# Patient Record
Sex: MALE | Race: WHITE | Employment: FULL TIME | URBAN - METROPOLITAN AREA
[De-identification: names, ages, dates, MRNs, and addresses within clinical notes are randomized per-mention and may not be internally consistent; named-entity substitution may affect disease eponyms.]

---

## 2019-05-21 ENCOUNTER — ANCILLARY PROCEDURE (OUTPATIENT)
Dept: GENERAL RADIOLOGY | Facility: CLINIC | Age: 34
End: 2019-05-21
Attending: PHYSICIAN ASSISTANT
Payer: COMMERCIAL

## 2019-05-21 ENCOUNTER — OFFICE VISIT (OUTPATIENT)
Dept: URGENT CARE | Facility: URGENT CARE | Age: 34
End: 2019-05-21
Payer: COMMERCIAL

## 2019-05-21 VITALS
WEIGHT: 175 LBS | OXYGEN SATURATION: 99 % | DIASTOLIC BLOOD PRESSURE: 84 MMHG | SYSTOLIC BLOOD PRESSURE: 136 MMHG | TEMPERATURE: 98.4 F | HEART RATE: 90 BPM

## 2019-05-21 DIAGNOSIS — R07.81 RIB PAIN: ICD-10-CM

## 2019-05-21 DIAGNOSIS — S20.212A RIB CONTUSION, LEFT, INITIAL ENCOUNTER: Primary | ICD-10-CM

## 2019-05-21 PROCEDURE — 99203 OFFICE O/P NEW LOW 30 MIN: CPT | Performed by: PHYSICIAN ASSISTANT

## 2019-05-21 PROCEDURE — 71101 X-RAY EXAM UNILAT RIBS/CHEST: CPT | Mod: LT

## 2019-05-21 RX ORDER — IBUPROFEN 200 MG
200 TABLET ORAL EVERY 4 HOURS PRN
COMMUNITY

## 2019-05-21 NOTE — PROGRESS NOTES
"SUBJECTIVE:   Dilip Holliday is a 34 year old male presenting for evaluation of   Chief Complaint   Patient presents with     Urgent Care     Pt in clinic to have eval for left side rib pain.     Rib Pain   .  A few weeks ago he was doing some testing in [facility]. He was sparring with a partner and took a few hits to the left ribs. Initially \"he didn't feel anything.\" But later he noticed feeling sore. He has had ongoing left rib pain and tenderness, which got better but then got worse again after doing Taekwando again.  It hurts to run and hurts if he lays on his left side.  No shortness of breath. No cough. No fever.  He has been taking ibuprofen since last night for pain.      ROS  See HPI    PMH:  Past Medical History:   Diagnosis Date     NO ACTIVE PROBLEMS      There are no active problems to display for this patient.        Current medications:  Current Outpatient Medications   Medication Sig Dispense Refill     ibuprofen (ADVIL/MOTRIN) 200 MG tablet Take 200 mg by mouth every 4 hours as needed for mild pain         Surgical History:  No past surgical history on file.    Family history:  No family history on file.      Social History:  Social History     Tobacco Use     Smoking status: Never Smoker     Smokeless tobacco: Never Used   Substance Use Topics     Alcohol use: Not on file         OBJECTIVE  /84   Pulse 90   Temp 98.4  F (36.9  C) (Oral)   Wt 79.4 kg (175 lb)   SpO2 99%     Physical Exam    General Appearance:  Alert, cooperative, no distress, appears stated age. Afebrile. Appears comfortable sitting in chair. Rises from seated position without difficulty.  Integument: Warm, dry, no rashes or lesions.  Respiratory: No distress. Lungs clear to ausculation bilaterally. No crackles, wheezes, rhonchi or stridor.  Cardiovascular: Regular rate and rhythm, no murmur, rub or gallop. No obvious chest wall deformities  Musculoskeletal: chest: no sternal tenderness. Diffuse tenderness over left rib cage " under axilla ranging to mid-clavicular line.        Labs:  No results found for this or any previous visit (from the past 24 hour(s)).    X-Ray was done, my findings are: no displaced rib fractures  Lungs clear, no infiltrates or atelectasis        ASSESSMENT/PLAN:      ICD-10-CM    1. Rib contusion, left, initial encounter S20.212A    2. Rib pain R07.81 XR Ribs & Chest Left G/E 3 Views        Medical Decision Making:     Serious Comorbid Conditions: none    Differential Diagnosis: rib contusion vs rib fracture    Xrays revealed no displaced rib fracture and clear lungs  History and negative xrays most suggestive of rib contusion.  Recommend rest, ice, NSAIDs. Stressed the importance of deep breaths to prevent pneumonia or atlectasis- do not think this will be a problem for him as he is not splinting with breathing now.  Follow up with PCP if no improvement in pain in 2-3 weeks, sooner if new/worsening symptoms such as cough or fever.    At the end of the encounter, I discussed all available results with patient. Discussed diagnosis and treatment plan.   Return precautions provided to patient and printed below in patient instructions.  Patient understood and agreed to plan. Patient was appropriate for discharge.        Patient Instructions   There were no fractures seen on your rib xrays today.  The lungs underneath also look great!    Your symptoms are likely due to a rib contusion.  Take ibuprofen as needed for pain. Take with food.  Important to practice taking big deep breaths frequently to prevent pneumonia or collapsed lung.  It may take 4-6 weeks for complete healing.  Follow up with your doctor if no improvement in pain in 2-3 weeks.  Be seen immediately if fever, cough, or any other new, concerning symptoms.    Patient Education     Rib Contusion     A rib contusion is a bruise to one or more rib bones. It may cause pain, tenderness, swelling and a purplish discoloration. There may be a sharp pain while  breathing.  You will be assessed for other injuries. You will likely be given pain medicine. Rib contusions heal on their own, without further treatment. However, pain may take weeks to months to go away.   Note that a small crack (fracture) in the rib may cause the same symptoms as a rib contusion. The small crack may not be seen on a chest X-ray. However, the conditions are managed in the same way.  Home care    Rest. Avoid heavy lifting, strenuous exertion, or any activity that causes pain.    Ice the area to reduce pain and swelling. Put ice cubes in a plastic bag or use a cold pack. (Wrap the cold source in a thin towel. Do not place it directly on your skin.) Ice the injured area for 20 minutes every 1 to 2 hours the first day. Continue with ice packs 3 to 4 times a day for the next 2 days, then as needed for the relief of pain and swelling.    Take any prescribed pain medicine as directed by your healthcare provider. If none was prescribed, take acetaminophen, ibuprofen, or naproxen to control pain.    If you have a significant injury, you may be given a device called an incentive spirometer to keep your lungs healthy. Use as directed.  Follow-up care  Follow up with your healthcare provider during the next week or as directed.  When to seek medical advice  Call your healthcare provider for any of the following:    Shortness of breath or trouble breathing    Increasing chest pain with breathing    Coughing    Dizziness, weakness, or fainting    New or worsening pain    Fever of 100.4 F (38 C) or higher, or as directed by your healthcare provider  Date Last Reviewed: 2/1/2017 2000-2018 [employer]. [address] [place], [place] [id number]. All rights reserved. This information is not intended as a substitute for professional medical care. Always follow your healthcare professional's instructions.                     Priyanka Pyle PA-C  05/21/19 5:04 PM

## 2019-05-21 NOTE — PATIENT INSTRUCTIONS
There were no fractures seen on your rib xrays today.  The lungs underneath also look great!    Your symptoms are likely due to a rib contusion.  Take ibuprofen as needed for pain. Take with food.  Important to practice taking big deep breaths frequently to prevent pneumonia or collapsed lung.  It may take 4-6 weeks for complete healing.  Follow up with your doctor if no improvement in pain in 2-3 weeks.  Be seen immediately if fever, cough, or any other new, concerning symptoms.    Patient Education     Rib Contusion     A rib contusion is a bruise to one or more rib bones. It may cause pain, tenderness, swelling and a purplish discoloration. There may be a sharp pain while breathing.  You will be assessed for other injuries. You will likely be given pain medicine. Rib contusions heal on their own, without further treatment. However, pain may take weeks to months to go away.   Note that a small crack (fracture) in the rib may cause the same symptoms as a rib contusion. The small crack may not be seen on a chest X-ray. However, the conditions are managed in the same way.  Home care    Rest. Avoid heavy lifting, strenuous exertion, or any activity that causes pain.    Ice the area to reduce pain and swelling. Put ice cubes in a plastic bag or use a cold pack. (Wrap the cold source in a thin towel. Do not place it directly on your skin.) Ice the injured area for 20 minutes every 1 to 2 hours the first day. Continue with ice packs 3 to 4 times a day for the next 2 days, then as needed for the relief of pain and swelling.    Take any prescribed pain medicine as directed by your healthcare provider. If none was prescribed, take acetaminophen, ibuprofen, or naproxen to control pain.    If you have a significant injury, you may be given a device called an incentive spirometer to keep your lungs healthy. Use as directed.  Follow-up care  Follow up with your healthcare provider during the next week or as directed.  When to  seek medical advice  Call your healthcare provider for any of the following:    Shortness of breath or trouble breathing    Increasing chest pain with breathing    Coughing    Dizziness, weakness, or fainting    New or worsening pain    Fever of 100.4 F (38 C) or higher, or as directed by your healthcare provider  Date Last Reviewed: 2/1/2017 2000-2018 [employer]. [address] [place] [address]. All rights reserved. This information is not intended as a substitute for professional medical care. Always follow your healthcare professional's instructions.

## 2019-05-22 ENCOUNTER — TELEPHONE (OUTPATIENT)
Dept: FAMILY MEDICINE | Facility: CLINIC | Age: 34
End: 2019-05-22

## 2019-05-22 NOTE — TELEPHONE ENCOUNTER
Sounds good.  Please ask him to return to primary care in one week for a follow up and repeat chest xray.  Appointment with a provider please.  Thank you.

## 2019-05-22 NOTE — TELEPHONE ENCOUNTER
Dr. Solomon review:  Please review Xray and give a follow up time frame for the repeat xray that was recommended.        Radiologist from [facility] calling with abnormal finding on xray .    Dr. Festus Gamez calling from reading room reporting what could be a very small right pneumothorax which will need a follow up Xray (no time frame given when asked)     The patient had injury and pain on left rib area.      Triage nurse to send this message to Dr. Solomon who is covering today for .      Patient reports no PCP or clinic and told him we are happy to follow up with him here.  He is going to refrain from his Taekwando for a few weeks.    Told him to call and report any breathing difficulties. He has no respiratory problem at this time.   Yvette Zamorano RN

## 2019-05-29 ENCOUNTER — OFFICE VISIT (OUTPATIENT)
Dept: FAMILY MEDICINE | Facility: CLINIC | Age: 34
End: 2019-05-29
Payer: COMMERCIAL

## 2019-05-29 ENCOUNTER — ANCILLARY PROCEDURE (OUTPATIENT)
Dept: GENERAL RADIOLOGY | Facility: CLINIC | Age: 34
End: 2019-05-29
Attending: NURSE PRACTITIONER
Payer: COMMERCIAL

## 2019-05-29 VITALS
OXYGEN SATURATION: 97 % | DIASTOLIC BLOOD PRESSURE: 88 MMHG | HEART RATE: 80 BPM | HEIGHT: 73 IN | BODY MASS INDEX: 23.59 KG/M2 | TEMPERATURE: 97.8 F | WEIGHT: 178 LBS | SYSTOLIC BLOOD PRESSURE: 127 MMHG

## 2019-05-29 DIAGNOSIS — J93.9 PNEUMOTHORAX, RIGHT: ICD-10-CM

## 2019-05-29 DIAGNOSIS — J93.9 PNEUMOTHORAX, RIGHT: Primary | ICD-10-CM

## 2019-05-29 PROCEDURE — 71046 X-RAY EXAM CHEST 2 VIEWS: CPT

## 2019-05-29 PROCEDURE — 99213 OFFICE O/P EST LOW 20 MIN: CPT | Performed by: NURSE PRACTITIONER

## 2019-05-29 ASSESSMENT — MIFFLIN-ST. JEOR: SCORE: 1801.28

## 2019-05-29 NOTE — PROGRESS NOTES
"Subjective     Dilip Holliday is a 34 year old male who presents to clinic today for the following health issues:    Chief Complaint   Patient presents with     RECHECK     pneumothorax left side 3x weeks ago last monday struggling out of bed         HPI     3 weeks ago Dilip was \"testing\" in [facility].  He sustained \"a few hits to the ribs.\"  Over the next week he seemed to feel better.  He went back to [facility] on 5/15/2019.  After the 5/19 class was completed, his chest was bothering him.   He was seen on 5/21/2019 in  with chest pain and he was diagnosed with a small pneumothorax.     Lately he has been tired, avoiding sleeping on his stomach.     Yesterday he walked 6 blocks while carrying groceries and he did great. No SOB, chest pain, etc.  He is here today for follow-up chest x-ray as he was instructed.    He denies any other acute issues.      There is no problem list on file for this patient.    History reviewed. No pertinent surgical history.    Social History     Tobacco Use     Smoking status: Never Smoker     Smokeless tobacco: Never Used   Substance Use Topics     Alcohol use: Not on file     History reviewed. No pertinent family history.      Current Outpatient Medications   Medication Sig Dispense Refill     ibuprofen (ADVIL/MOTRIN) 200 MG tablet Take 200 mg by mouth every 4 hours as needed for mild pain       No Known Allergies  No lab results found.   BP Readings from Last 3 Encounters:   05/29/19 127/88   05/21/19 136/84    Wt Readings from Last 3 Encounters:   05/29/19 80.7 kg (178 lb)   05/21/19 79.4 kg (175 lb)                    Reviewed and updated as needed this visit by Provider  Tobacco  Allergies  Meds  Problems  Med Hx  Surg Hx  Fam Hx         Review of Systems   ROS COMP: Constitutional, HEENT, cardiovascular, pulmonary, GI, , musculoskeletal, neuro, skin, endocrine and psych systems are negative, except as otherwise noted.      Objective    /88 (BP Location: Left arm, " "Patient Position: Sitting, Cuff Size: Adult Regular)   Pulse 80   Temp 97.8  F (36.6  C) (Oral)   Ht 1.854 m (6' 1\")   Wt 80.7 kg (178 lb)   SpO2 97%   BMI 23.48 kg/m    Body mass index is 23.48 kg/m .  Physical Exam     GENERAL: healthy, alert and no distress  EYES: Eyes grossly normal to inspection, PERRL and conjunctivae and sclerae normal  NECK: no adenopathy and thyroid normal to palpation  RESP: Respirations are easy and regular.  Chest expansion is symmetrical. Lungs clear to auscultation - no rales, rhonchi or wheezes  CV: regular rate and rhythm, normal S1 S2, no S3 or S4, no murmur, click or rub, no peripheral edema and peripheral pulses strong    MS: Ambulatory with a steady gait.   SKIN: warm and dry  NEURO: Normal strength and tone, mentation intact and speech normal  PSYCH: mentation appears normal, affect normal/bright      Chest x-ray: I interpreted today's chest x-ray is clear with no sign pneumothorax.  I did discuss with the patient and we will also await the radiologist's final interpretation of the x-ray.  I will let him know by my chart message those final results.        Assessment & Plan     (J93.9) Pneumothorax, right  (primary encounter diagnosis)  Comment: Resolved  Plan: For today, I discussed with the patient that it appears his pneumothorax is resolved.  No additional follow-up x-rays will be necessary.  If he develops any other questions or concerns, he can be rechecked anytime.           No follow-ups on file.    CHIDI Dseir [facility]        "

## 2019-05-30 NOTE — RESULT ENCOUNTER NOTE
Otoniel Cherry,    This note is to let you know that the radiologist interpreted your chest x-ray is clear with no remaining pneumothorax.  There are no additional x-rays necessary for pneumothorax follow-up.    Take good care of yourself. If there is anything else that I can do for you, please do not hesitate to let me know.    Lyndsey MURILLO CNP

## 2019-05-30 NOTE — PATIENT INSTRUCTIONS
Patient Education     Traumatic (Blunt Trauma) Pneumothorax  Pneumothorax is when air leaks out and gets trapped in the space between the lung and the chest wall (pleural space). It can cause complete or partial collapse of a lung. The trapped air prevents the lung from re-inflating. Pneumothorax can occur as a result of a blow to the chest, such as from a fall or car accident (blunt trauma). It can happen with or without a broken rib.  A small pneumothorax caused by blunt trauma can be treated at home. The trapped air will be absorbed, and the lung will re-expand by itself. Larger amounts of trapped air must be treated in a hospital.  Symptoms    Trouble breathing or being unable to take a full breath    Sharp chest pain when breathing    Tightness in the chest  Home care    Rest at home. Do not do vigorous activity or exercise for the next week.    You may use over-the-counter pain medicine to control pain, unless another medicine was prescribed. If you have chronic liver or kidney disease or have ever had a stomach ulcer or gastrointestinal bleeding, talk with your healthcare provider before using these medicines. Also talk with your provider if you are taking medicine to prevent blood clots.    During the next 3 days, it is important to take 4 slow, deep breaths every 1 to 2 hours while awake. Do this even though your chest may hurt when you breathe. It sends extra oxygen and blood to the lung. This is important to help keep the lung expanded. If an incentive spirometer (breathing exercise device) was given, use it as directed.    If you smoke or use e-cigarettes, quit.    Do not fly or go diving until your healthcare provider says it s safe to do so.  Follow-up care  Follow up with your healthcare provider, or as advised. If X-rays have been taken, you will be notified of any new findings that may affect your care. You can also call as advised for the results.  Call 911  Call 911 if any of these  occur.    Trouble breathing    Confusion or difficulty arousing    Rapid heart rate    New pain in the chest, arm, shoulder, neck or upper back    Weakness, dizziness, or fainting  When to seek medical advice  Call your healthcare provider right away if any of these occur.    Increased pain with breathing    Fever, productive cough  Date Last Reviewed: 10/1/2016    [id number] [employer]. [address] [place] [address]. All rights reserved. This information is not intended as a substitute for professional medical care. Always follow your healthcare professional's instructions.

## 2019-08-22 NOTE — TELEPHONE ENCOUNTER
RECORDS RECEIVED FROM: Rib pain on right side/Lyndsey Sawyer APRN CNP/XR/[facility]/ortho con   DATE RECEIVED: 8/22   NOTES STATUS DETAILS   OFFICE NOTE from referring provider Internal    OFFICE NOTE from other specialist Internal    DISCHARGE SUMMARY from hospital N/A    DISCHARGE REPORT from the ER N/A    OPERATIVE REPORT N/A    MEDICATION LIST Internal    MRI N/A    CT SCAN N/A    XRAYS (IMAGES & REPORTS) Internal    TUMOR     PATHOLOGY  Slides & report N/A

## 2019-09-03 ENCOUNTER — OFFICE VISIT (OUTPATIENT)
Dept: ORTHOPEDICS | Facility: CLINIC | Age: 34
End: 2019-09-03
Payer: COMMERCIAL

## 2019-09-03 ENCOUNTER — PRE VISIT (OUTPATIENT)
Dept: ORTHOPEDICS | Facility: CLINIC | Age: 34
End: 2019-09-03

## 2019-09-03 VITALS — WEIGHT: 180 LBS | BODY MASS INDEX: 23.86 KG/M2 | HEIGHT: 73 IN

## 2019-09-03 DIAGNOSIS — S29.011D INTERCOSTAL MUSCLE STRAIN, SUBSEQUENT ENCOUNTER: ICD-10-CM

## 2019-09-03 DIAGNOSIS — S20.212D RIB CONTUSION, LEFT, SUBSEQUENT ENCOUNTER: Primary | ICD-10-CM

## 2019-09-03 ASSESSMENT — MIFFLIN-ST. JEOR: SCORE: 1810.35

## 2019-09-03 NOTE — Clinical Note
Thanks for the referral.  Surprising that he would have a traumatic pneumothorax w/o rib injury. I bet he had an occult fx which is slowly resolving and irritating the muscles attached to it. He declined any further workup or intervention. I think he will continue to improve.  - Andriy

## 2019-09-03 NOTE — LETTER
"  9/3/2019      RE: Dilip Holliday  [address]  [place] [address]       Pt is a 34 year old male referred by Dr Sawyer here today for:     L Rib pain   Initially presented 5/21/19 w/ rib pain s/p sparring injury in [facility]  Neg rib fx but subsequently noted to have small R pneumothorax -> resolved but rib pain has persisted  Overall pain is significantly better  Heavy lifting and core activities still very sore  Restarted [employer] classes again in mid July but between injury and then, no real gym workouts or impact   No pain w/ regular activities     See below for additional details    IPGt message 8/19/19:   \"Otoniel Solorio,     Thanks for the response. I am still on the fence with the option I want to choose, but I am leaning towards the ortho option to look deeper into my muscles/tendons. My breathing isn't an issue for me. I can run across the street without getting phased (out of breath). It's just when I lay flat on a hard surface or lift something really heavy on my left side that I can feel the deep pain in my ribcage on that side. I went kayaking over the weekend, but the actual paddling wasn't hard to do, it was lifting the kayak off the beach with my left arm (where the pain is under) versus my right that was much harder to do\"    Past Medical History:   Diagnosis Date     NO ACTIVE PROBLEMS       No past surgical history on file.   Current Outpatient Medications   Medication Sig Dispense Refill     ibuprofen (ADVIL/MOTRIN) 200 MG tablet Take 200 mg by mouth every 4 hours as needed for mild pain        No Known Allergies   Social History     Tobacco Use     Smoking status: Never Smoker     Smokeless tobacco: Never Used   Substance Use Topics     Alcohol use: None     Drug use: None      No family history on file.     ROS:   Gen- no fevers/chills   Rheum - no morning stiffness   Derm - no rash/ redness   Neuro - no numbness, no tingling   Remainder of ROS negative.     Exam:   Ht 1.854 " "m (6' 1\")   Wt 81.6 kg (180 lb)   BMI 23.75 kg/m        +TTP along axillar ribcage on L   No pain w/ resisted shoulder/ arm motions      (S20.212D) Rib contusion, left, subsequent encounter  (primary encounter diagnosis)  Comment:   Plan: reviewed likely dx of rib contusion vs occult fx vs intercostal muscle injury; regardless, tx would be 4-6 wks of rest and subsequent rehab and reintroduction of activities; I discussed checking a bone scan or MRI though I am unsure how much this would change mgmt.  I also discussed option of PT w/ focus on obliques/ core; he can pad the area when sparring or try a lidoderm patch prn; he declined all imaging and interventions and was pleased w/ reassurance; f/u prn    (S29.011D) Intercostal muscle strain, subsequent encounter  Comment:   Plan: see above      Andriy Sam MD  September 3, 2019  4:34 PM      "

## 2020-03-11 ENCOUNTER — HEALTH MAINTENANCE LETTER (OUTPATIENT)
Age: 35
End: 2020-03-11

## 2020-11-15 ENCOUNTER — OFFICE VISIT (OUTPATIENT)
Dept: URGENT CARE | Facility: URGENT CARE | Age: 35
End: 2020-11-15
Payer: COMMERCIAL

## 2020-11-15 ENCOUNTER — NURSE TRIAGE (OUTPATIENT)
Dept: NURSING | Facility: CLINIC | Age: 35
End: 2020-11-15

## 2020-11-15 VITALS
DIASTOLIC BLOOD PRESSURE: 84 MMHG | SYSTOLIC BLOOD PRESSURE: 128 MMHG | HEART RATE: 102 BPM | BODY MASS INDEX: 23.86 KG/M2 | TEMPERATURE: 97.4 F | OXYGEN SATURATION: 98 % | WEIGHT: 180 LBS | HEIGHT: 73 IN | RESPIRATION RATE: 15 BRPM

## 2020-11-15 DIAGNOSIS — R31.9 HEMATURIA, UNSPECIFIED TYPE: Primary | ICD-10-CM

## 2020-11-15 LAB
ALBUMIN UR-MCNC: NEGATIVE MG/DL
APPEARANCE UR: CLEAR
BILIRUB UR QL STRIP: NEGATIVE
COLOR UR AUTO: YELLOW
GLUCOSE UR STRIP-MCNC: NEGATIVE MG/DL
HGB UR QL STRIP: NEGATIVE
KETONES UR STRIP-MCNC: NEGATIVE MG/DL
LEUKOCYTE ESTERASE UR QL STRIP: NEGATIVE
NITRATE UR QL: NEGATIVE
PH UR STRIP: 6.5 PH (ref 5–7)
SOURCE: NORMAL
SP GR UR STRIP: 1.01 (ref 1–1.03)
UROBILINOGEN UR STRIP-ACNC: 0.2 EU/DL (ref 0.2–1)

## 2020-11-15 PROCEDURE — 99213 OFFICE O/P EST LOW 20 MIN: CPT | Performed by: FAMILY MEDICINE

## 2020-11-15 PROCEDURE — 81003 URINALYSIS AUTO W/O SCOPE: CPT | Performed by: PHYSICIAN ASSISTANT

## 2020-11-15 ASSESSMENT — MIFFLIN-ST. JEOR: SCORE: 1805.35

## 2020-11-15 NOTE — PROGRESS NOTES
"SUBJECTIVE:  Chief Complaint   Patient presents with     Urgent Care     Urinary Problem     blood in urine after he gets aroused/ejaculation. Started 3 weeks ago, but did pinch head of penis 4 weeks ago in zipper.      Dilip Holliday is a 35 year old male presents with a chief complaint of blood when he ejaculates.    Will notice blood after ejaculation, this has occurred every time he ejaculated since injury about 1 month ago.  No pain in testes.  Patient is circumcised.  Denies any blood in urine, no pain with urination.    Patient commented that had accidentally caught the head of penis on zipper.  Did notice urine was lesly colored the following day.  Had been drinking plenty of fluids afterwards, thought that may have been due to too much alcohol from the previous day.     Past Medical History:   Diagnosis Date     NO ACTIVE PROBLEMS      Current Outpatient Medications   Medication Sig Dispense Refill     ibuprofen (ADVIL/MOTRIN) 200 MG tablet Take 200 mg by mouth every 4 hours as needed for mild pain       Social History     Tobacco Use     Smoking status: Never Smoker     Smokeless tobacco: Never Used   Substance Use Topics     Alcohol use: Not on file       ROS:  Review of systems negative except as stated above.    EXAM:   /84   Pulse 102   Temp 97.4  F (36.3  C) (Tympanic)   Resp 15   Ht 1.854 m (6' 1\")   Wt 81.6 kg (180 lb)   SpO2 98%   BMI 23.75 kg/m    GENERAL APPEARANCE: healthy, alert and no distress  PSYCH: alert, affect bright    Results for orders placed or performed in visit on 11/15/20   *UA reflex to Microscopic and Culture ([facility] (except [place] and [place])     Status: None    Specimen: Midstream Urine   Result Value Ref Range    Color Urine Yellow     Appearance Urine Clear     Glucose Urine Negative NEG^Negative mg/dL    Bilirubin Urine Negative NEG^Negative    Ketones Urine Negative NEG^Negative mg/dL    Specific Gravity Urine 1.010 1.003 - 1.035    Blood " Urine Negative NEG^Negative    pH Urine 6.5 5.0 - 7.0 pH    Protein Albumin Urine Negative NEG^Negative mg/dL    Urobilinogen Urine 0.2 0.2 - 1.0 EU/dL    Nitrite Urine Negative NEG^Negative    Leukocyte Esterase Urine Negative NEG^Negative    Source Midstream Urine          ASSESSMENT/PLAN:   (R31.9) Hematuria, unspecified type  (primary encounter diagnosis)  Plan: *UA reflex to Microscopic and Culture ([facility] and         [facility])            Reviewed normal urine, discussed that if notice blood in semen/ejaculation only that needs further evaluation of testes.  Would not expect that minor injury at tip of penis continue to cause bloody semen at this time.  Reviewed that can follow up with primary provider or Urology for further workup.    Patient to follow up with Urology or primary provider    Nigel Cochran MD  November 15, 2020 4:43 PM

## 2020-11-15 NOTE — TELEPHONE ENCOUNTER
Dilip said he has noticed some blood in urine and in semen. Said he has also noted some clots too. Has happened more then once. Recommended following up with his provider early this week. He verbalized understanding.           Additional Information    Negative: [1] Diagnosed urine infection AND [2] female taking antibiotic    Negative: [1] Diagnosed urine infection AND [2] male taking antibiotic    Negative: Patient sounds very sick or weak to the triager    Negative: [1] POSITIVE urine test (i.e., NI + or LE+ or WBC > 10) AND [2] fever > 100.5 F (38.1 C)    Negative: [1] POSITIVE urine test AND [2] side (flank) or lower back pain present    Negative: [1] POSITIVE urine test AND [2] pregnant    Negative: Shock suspected (e.g., cold/pale/clammy skin, too weak to stand, low BP, rapid pulse)    Negative: Sounds like a life-threatening emergency to the triager    Blood in the urine is main symptom    Negative: Shock suspected (e.g., cold/pale/clammy skin, too weak to stand, low BP, rapid pulse)    Negative: Sounds like a life-threatening emergency to the triager    Negative: Urinary catheter, questions about    Negative: Recent back or abdominal injury    Negative: Recent genital injury    Negative: [1] Unable to urinate (or only a few drops) > 4 hours AND [2] bladder feels very full (e.g., palpable bladder or strong urge to urinate)    Negative: Passing pure blood or large blood clots (i.e., size > a dime) (Exception: magui or small strands)    Negative: Fever > 100.5 F (38.1 C)    Negative: Patient sounds very sick or weak to the triager    Negative: Known sickle cell disease    Negative: Taking Coumadin (warfarin) or other strong blood thinner, or known bleeding disorder (e.g., thrombocytopenia)    Negative: Side (flank) or back pain present    Negative: Pain or burning with passing urine    Negative: [1] Pink or red-colored urine and likely from food (beets, rhubarb, red food dye) AND [2] lasts > 24 hours after  stopping food    Negative: Blood in urine  (Exception: could be normal menstrual bleeding)    Negative: [1] Female AND [2] before menopause AND [3] could be normal menstrual bleeding    Negative: Pink or red-colored urine and likely from food (beets, rhubarb, red food dye)    Protocols used: URINALYSIS RESULTS FOLLOW-UP CALL-A-, URINARY SYMPTOMS-A-, URINE - BLOOD IN-A-

## 2021-01-03 ENCOUNTER — HEALTH MAINTENANCE LETTER (OUTPATIENT)
Age: 36
End: 2021-01-03

## 2021-04-25 ENCOUNTER — HEALTH MAINTENANCE LETTER (OUTPATIENT)
Age: 36
End: 2021-04-25

## 2021-08-26 ENCOUNTER — LAB REQUISITION (OUTPATIENT)
Dept: LAB | Facility: CLINIC | Age: 36
End: 2021-08-26
Payer: COMMERCIAL

## 2021-08-26 PROCEDURE — U0005 INFEC AGEN DETEC AMPLI PROBE: HCPCS | Mod: ORL | Performed by: FAMILY MEDICINE

## 2021-08-27 LAB — SARS-COV-2 RNA RESP QL NAA+PROBE: NEGATIVE

## 2021-09-02 ENCOUNTER — LAB REQUISITION (OUTPATIENT)
Dept: LAB | Facility: CLINIC | Age: 36
End: 2021-09-02
Payer: COMMERCIAL

## 2021-09-02 PROCEDURE — U0003 INFECTIOUS AGENT DETECTION BY NUCLEIC ACID (DNA OR RNA); SEVERE ACUTE RESPIRATORY SYNDROME CORONAVIRUS 2 (SARS-COV-2) (CORONAVIRUS DISEASE [COVID-19]), AMPLIFIED PROBE TECHNIQUE, MAKING USE OF HIGH THROUGHPUT TECHNOLOGIES AS DESCRIBED BY CMS-2020-01-R: HCPCS | Mod: ORL | Performed by: FAMILY MEDICINE

## 2021-09-03 LAB — SARS-COV-2 RNA RESP QL NAA+PROBE: NEGATIVE

## 2021-09-08 ENCOUNTER — LAB REQUISITION (OUTPATIENT)
Dept: LAB | Facility: CLINIC | Age: 36
End: 2021-09-08
Payer: COMMERCIAL

## 2021-09-08 PROCEDURE — U0003 INFECTIOUS AGENT DETECTION BY NUCLEIC ACID (DNA OR RNA); SEVERE ACUTE RESPIRATORY SYNDROME CORONAVIRUS 2 (SARS-COV-2) (CORONAVIRUS DISEASE [COVID-19]), AMPLIFIED PROBE TECHNIQUE, MAKING USE OF HIGH THROUGHPUT TECHNOLOGIES AS DESCRIBED BY CMS-2020-01-R: HCPCS | Mod: ORL | Performed by: NURSE PRACTITIONER

## 2021-09-09 LAB — SARS-COV-2 RNA RESP QL NAA+PROBE: NEGATIVE

## 2021-09-16 ENCOUNTER — LAB REQUISITION (OUTPATIENT)
Dept: LAB | Facility: CLINIC | Age: 36
End: 2021-09-16
Payer: COMMERCIAL

## 2021-09-16 PROCEDURE — U0005 INFEC AGEN DETEC AMPLI PROBE: HCPCS | Mod: ORL | Performed by: NURSE PRACTITIONER

## 2021-09-17 LAB — SARS-COV-2 RNA RESP QL NAA+PROBE: NEGATIVE

## 2021-09-23 ENCOUNTER — LAB REQUISITION (OUTPATIENT)
Dept: LAB | Facility: CLINIC | Age: 36
End: 2021-09-23
Payer: COMMERCIAL

## 2021-09-23 PROCEDURE — U0005 INFEC AGEN DETEC AMPLI PROBE: HCPCS | Mod: ORL | Performed by: FAMILY MEDICINE

## 2021-09-24 LAB — SARS-COV-2 RNA RESP QL NAA+PROBE: NEGATIVE

## 2021-09-30 ENCOUNTER — LAB REQUISITION (OUTPATIENT)
Dept: LAB | Facility: CLINIC | Age: 36
End: 2021-09-30
Payer: COMMERCIAL

## 2021-09-30 PROCEDURE — U0003 INFECTIOUS AGENT DETECTION BY NUCLEIC ACID (DNA OR RNA); SEVERE ACUTE RESPIRATORY SYNDROME CORONAVIRUS 2 (SARS-COV-2) (CORONAVIRUS DISEASE [COVID-19]), AMPLIFIED PROBE TECHNIQUE, MAKING USE OF HIGH THROUGHPUT TECHNOLOGIES AS DESCRIBED BY CMS-2020-01-R: HCPCS | Mod: ORL | Performed by: NURSE PRACTITIONER

## 2021-10-01 LAB — SARS-COV-2 RNA RESP QL NAA+PROBE: NEGATIVE

## 2021-10-06 ENCOUNTER — LAB REQUISITION (OUTPATIENT)
Dept: LAB | Facility: CLINIC | Age: 36
End: 2021-10-06
Payer: COMMERCIAL

## 2021-10-06 PROCEDURE — U0003 INFECTIOUS AGENT DETECTION BY NUCLEIC ACID (DNA OR RNA); SEVERE ACUTE RESPIRATORY SYNDROME CORONAVIRUS 2 (SARS-COV-2) (CORONAVIRUS DISEASE [COVID-19]), AMPLIFIED PROBE TECHNIQUE, MAKING USE OF HIGH THROUGHPUT TECHNOLOGIES AS DESCRIBED BY CMS-2020-01-R: HCPCS | Mod: ORL | Performed by: NURSE PRACTITIONER

## 2021-10-07 LAB — SARS-COV-2 RNA RESP QL NAA+PROBE: NEGATIVE

## 2021-10-10 ENCOUNTER — HEALTH MAINTENANCE LETTER (OUTPATIENT)
Age: 36
End: 2021-10-10

## 2021-10-13 ENCOUNTER — LAB REQUISITION (OUTPATIENT)
Dept: LAB | Facility: CLINIC | Age: 36
End: 2021-10-13
Payer: COMMERCIAL

## 2021-10-13 PROCEDURE — U0003 INFECTIOUS AGENT DETECTION BY NUCLEIC ACID (DNA OR RNA); SEVERE ACUTE RESPIRATORY SYNDROME CORONAVIRUS 2 (SARS-COV-2) (CORONAVIRUS DISEASE [COVID-19]), AMPLIFIED PROBE TECHNIQUE, MAKING USE OF HIGH THROUGHPUT TECHNOLOGIES AS DESCRIBED BY CMS-2020-01-R: HCPCS | Mod: ORL | Performed by: NURSE PRACTITIONER

## 2021-10-15 LAB — SARS-COV-2 RNA RESP QL NAA+PROBE: NEGATIVE

## 2021-10-20 ENCOUNTER — LAB REQUISITION (OUTPATIENT)
Dept: LAB | Facility: CLINIC | Age: 36
End: 2021-10-20
Payer: COMMERCIAL

## 2021-10-20 PROCEDURE — U0003 INFECTIOUS AGENT DETECTION BY NUCLEIC ACID (DNA OR RNA); SEVERE ACUTE RESPIRATORY SYNDROME CORONAVIRUS 2 (SARS-COV-2) (CORONAVIRUS DISEASE [COVID-19]), AMPLIFIED PROBE TECHNIQUE, MAKING USE OF HIGH THROUGHPUT TECHNOLOGIES AS DESCRIBED BY CMS-2020-01-R: HCPCS | Mod: ORL | Performed by: NURSE PRACTITIONER

## 2021-10-22 LAB — SARS-COV-2 RNA RESP QL NAA+PROBE: NEGATIVE

## 2021-10-27 ENCOUNTER — LAB REQUISITION (OUTPATIENT)
Dept: LAB | Facility: CLINIC | Age: 36
End: 2021-10-27
Payer: COMMERCIAL

## 2021-10-27 PROCEDURE — U0005 INFEC AGEN DETEC AMPLI PROBE: HCPCS | Mod: ORL | Performed by: NURSE PRACTITIONER

## 2021-10-28 LAB — SARS-COV-2 RNA RESP QL NAA+PROBE: NEGATIVE

## 2021-11-02 ENCOUNTER — LAB REQUISITION (OUTPATIENT)
Dept: LAB | Facility: CLINIC | Age: 36
End: 2021-11-02
Payer: COMMERCIAL

## 2021-11-02 PROCEDURE — U0003 INFECTIOUS AGENT DETECTION BY NUCLEIC ACID (DNA OR RNA); SEVERE ACUTE RESPIRATORY SYNDROME CORONAVIRUS 2 (SARS-COV-2) (CORONAVIRUS DISEASE [COVID-19]), AMPLIFIED PROBE TECHNIQUE, MAKING USE OF HIGH THROUGHPUT TECHNOLOGIES AS DESCRIBED BY CMS-2020-01-R: HCPCS | Mod: ORL | Performed by: NURSE PRACTITIONER

## 2021-11-05 LAB — SARS-COV-2 RNA RESP QL NAA+PROBE: NOT DETECTED

## 2022-05-21 ENCOUNTER — HEALTH MAINTENANCE LETTER (OUTPATIENT)
Age: 37
End: 2022-05-21

## 2022-05-31 NOTE — TELEPHONE ENCOUNTER
[facility] CONSULT PLACED BY [facility] AT 5495 5/31/22 Spoke with pt and gave him the info from Lyndsey Sawyer CNP below  Appointment made for next wed for follow up.  Olimpia Feldman RN

## 2022-09-18 ENCOUNTER — HEALTH MAINTENANCE LETTER (OUTPATIENT)
Age: 37
End: 2022-09-18

## 2023-06-04 ENCOUNTER — HEALTH MAINTENANCE LETTER (OUTPATIENT)
Age: 38
End: 2023-06-04